# Patient Record
Sex: FEMALE | Race: WHITE | URBAN - METROPOLITAN AREA
[De-identification: names, ages, dates, MRNs, and addresses within clinical notes are randomized per-mention and may not be internally consistent; named-entity substitution may affect disease eponyms.]

---

## 2018-10-22 ENCOUNTER — IMMUNIZATION (OUTPATIENT)
Dept: SOCIAL WORK | Facility: CLINIC | Age: 53
End: 2018-10-22
Payer: COMMERCIAL

## 2018-10-22 DIAGNOSIS — Z23 NEED FOR VACCINATION: ICD-10-CM

## 2018-10-22 PROCEDURE — 90471 IMMUNIZATION ADMIN: CPT | Performed by: REGISTERED NURSE

## 2018-10-22 PROCEDURE — 90686 IIV4 VACC NO PRSV 0.5 ML IM: CPT | Performed by: REGISTERED NURSE

## 2020-10-04 ENCOUNTER — OFFICE VISIT (OUTPATIENT)
Dept: URGENT CARE | Facility: CLINIC | Age: 55
End: 2020-10-04
Payer: COMMERCIAL

## 2020-10-04 VITALS
OXYGEN SATURATION: 99 % | BODY MASS INDEX: 31.23 KG/M2 | TEMPERATURE: 96.9 F | WEIGHT: 199 LBS | HEIGHT: 67 IN | DIASTOLIC BLOOD PRESSURE: 78 MMHG | SYSTOLIC BLOOD PRESSURE: 108 MMHG | RESPIRATION RATE: 14 BRPM | HEART RATE: 113 BPM

## 2020-10-04 DIAGNOSIS — M54.40 ACUTE BILATERAL LOW BACK PAIN WITH SCIATICA, SCIATICA LATERALITY UNSPECIFIED: ICD-10-CM

## 2020-10-04 PROCEDURE — 99213 OFFICE O/P EST LOW 20 MIN: CPT | Performed by: PHYSICIAN ASSISTANT

## 2020-10-04 RX ORDER — NAPROXEN 500 MG/1
500 TABLET ORAL 2 TIMES DAILY WITH MEALS
Qty: 30 TAB | Refills: 0 | Status: SHIPPED | OUTPATIENT
Start: 2020-10-04

## 2020-10-04 RX ORDER — PREDNISONE 10 MG/1
40 TABLET ORAL DAILY
Qty: 20 TAB | Refills: 0 | Status: SHIPPED | OUTPATIENT
Start: 2020-10-04 | End: 2020-10-09

## 2020-10-04 ASSESSMENT — ENCOUNTER SYMPTOMS
CHILLS: 0
FEVER: 0
FALLS: 0
VOMITING: 0
ABDOMINAL PAIN: 0
SORE THROAT: 0
MYALGIAS: 0
CONSTIPATION: 0
DIARRHEA: 0
EYE PAIN: 0
SHORTNESS OF BREATH: 0
BACK PAIN: 1
NAUSEA: 0
COUGH: 0
HEADACHES: 0

## 2020-10-04 NOTE — LETTER
October 4, 2020    To Whom It May Concern:         This is confirmation that Christina Villareal attended her scheduled appointment with Ramirez Kraft P.A.-C. on 10/04/20.  I have advised her to remain off of work for the next three days to rest and recuperate.          If you have any questions please do not hesitate to call me at the phone number listed below.    Sincerely,          Ramirez Kraft P.A.-C.  [phone]

## 2020-10-04 NOTE — PATIENT INSTRUCTIONS
· Prednisone for 5 days  · Start naproxen twice daily  · Apply diclofenac cream once daily  · May use 500mg of tylenol twice daily as well  · Ice or heat for 10-15 minutes every 2-3 hours  · 3 days off of work  · Referral to primary care and physical therapy.      Chronic Back Pain  When back pain lasts longer than 3 months, it is called chronic back pain. Pain may get worse at certain times (flare-ups). There are things you can do at home to manage your pain.  Follow these instructions at home:  Activity         · Avoid bending and other activities that make pain worse.  · When standing:  ? Keep your upper back and neck straight.  ? Keep your shoulders pulled back.  ? Avoid slouching.  · When sitting:  ? Keep your back straight.  ? Relax your shoulders. Do not round your shoulders or pull them backward.  · Do not sit or  one place for long periods of time.  · Take short rest breaks during the day. Lying down or standing is usually better than sitting. Resting can help relieve pain.  · When sitting or lying down for a long time, do some mild activity or stretching. This will help to prevent stiffness and pain.  · Get regular exercise. Ask your doctor what activities are safe for you.  · Do not lift anything that is heavier than 10 lb (4.5 kg). To prevent injury when you lift things:  ? Bend your knees.  ? Keep the weight close to your body.  ? Avoid twisting.  Managing pain  · If told, put ice on the painful area. Your doctor may tell you to use ice for 24-48 hours after a flare-up starts.  ? Put ice in a plastic bag.  ? Place a towel between your skin and the bag.  ? Leave the ice on for 20 minutes, 2-3 times a day.  · If told, put heat on the painful area as often as told by your doctor. Use the heat source that your doctor recommends, such as a moist heat pack or a heating pad.  ? Place a towel between your skin and the heat source.  ? Leave the heat on for 20-30 minutes.  ? Remove the heat if your skin  turns bright red. This is especially important if you are unable to feel pain, heat, or cold. You may have a greater risk of getting burned.  · Soak in a warm bath. This can help relieve pain.  · Take over-the-counter and prescription medicines only as told by your doctor.  General instructions  · Sleep on a firm mattress. Try lying on your side with your knees slightly bent. If you lie on your back, put a pillow under your knees.  · Keep all follow-up visits as told by your doctor. This is important.  Contact a doctor if:  · You have pain that does not get better with rest or medicine.  Get help right away if:  · One or both of your arms or legs feel weak.  · One or both of your arms or legs lose feeling (numbness).  · You have trouble controlling when you poop (bowel movement) or pee (urinate).  · You feel sick to your stomach (nauseous).  · You throw up (vomit).  · You have belly (abdominal) pain.  · You have shortness of breath.  · You pass out (faint).  Summary  · When back pain lasts longer than 3 months, it is called chronic back pain.  · Pain may get worse at certain times (flare-ups).  · Use ice and heat as told by your doctor. Your doctor may tell you to use ice after flare-ups.  This information is not intended to replace advice given to you by your health care provider. Make sure you discuss any questions you have with your health care provider.  Document Released: 06/05/2009 Document Revised: 04/09/2020 Document Reviewed: 08/02/2018  [employer]vier Patient Education © 2020 [employer]

## 2020-10-04 NOTE — PROGRESS NOTES
"Subjective:   Christina Villareal is a 55 y.o. female who presents for Back Pain (Lower back pain x 3 weeks \"comes and goes\", feels like theres yanking when she tilts her head. 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  She has a remote injury of the back sustained around 10 years ago from a fall from a ladder and feels like this is flared up her pain.  She has bilateral low back pain and noticed occasional sharp shooting pain down the right leg with bending.  She is ambulatory but walks with a limp due to her pain.  She has not been taking any Tylenol or ibuprofen as she feels like these have not been beneficial to her in the past but she has been using topical warming patches as well as hot pads which seem to help.  She denies any numbness or tingling or weakness in the legs.  She has no bladder or bowel issues and no saddle anesthesia.  She has no acute injury or trauma but reports a slow progression of her back pain over the last 3 weeks      Review of Systems   Constitutional: Negative for chills and fever.   HENT: Negative for congestion, ear pain and sore throat.    Eyes: Negative for pain.   Respiratory: Negative for cough and shortness of breath.    Cardiovascular: Negative for chest pain.   Gastrointestinal: Negative for abdominal pain, constipation, diarrhea, nausea and vomiting.   Genitourinary: Negative for dysuria.   Musculoskeletal: Positive for back pain. Negative for falls and myalgias.   Skin: Negative for rash.   Neurological: Negative for headaches.       Medications:    • ALIVE WOMENS 50+ PO  • atorvastatin Tabs  • B COMPLEX 1 PO  • Diclofenac Sodium Gel  • doxylamine Tabs  • fluticasone  • naproxen Tabs  • omeprazole  • predniSONE Tabs    Allergies: Patient has no known allergies.    Problem List: Christina Vlilareal has Hyperlipidemia; " "Gastroesophageal reflux disease without esophagitis; Family history of breast cancer in first degree relative; Chronic back pain; Perimenopausal symptoms; and Dyslipidemia, goal LDL below 160 on their problem list.    Surgical History:  No past surgical history on file.    Past Social Hx: Christina Villareal  reports that she has quit smoking. Her smoking use included cigarettes. She has a 3.75 pack-year smoking history. She has never used smokeless tobacco. She reports current drug use. Drugs: Marijuana and Inhaled. She reports that she does not drink alcohol.     Past Family Hx:  Christina Villareal family history includes Arthritis in her mother; Cancer in her father and maternal grandmother; Cancer (age of onset: 53) in her mother; Diabetes in her paternal grandmother.     Problem list, medications, and allergies reviewed by myself today in Epic.     Objective:     /78 (BP Location: Left arm, Patient Position: Sitting, BP Cuff Size: Adult)   Pulse (!) 113   Temp 36.1 °C (96.9 °F) (Temporal)   Resp 14   Ht 1.689 m (5' 6.5\")   Wt 90.3 kg (199 lb)   SpO2 99%   BMI 31.64 kg/m²     Physical Exam  Vitals signs reviewed.   Constitutional:       Appearance: Normal appearance.      Comments: Uncomfortable appearing, stooped forward sitting on the exam table.  Winces when changing positions but as I escort her out of the building after the exam she is able to walk comfortably when she has taken a few steps   HENT:      Head: Normocephalic and atraumatic.      Right Ear: External ear normal.      Left Ear: External ear normal.      Nose: Nose normal.      Mouth/Throat:      Mouth: Mucous membranes are moist.   Eyes:      Conjunctiva/sclera: Conjunctivae normal.   Cardiovascular:      Rate and Rhythm: Normal rate.   Pulmonary:      Effort: Pulmonary effort is normal.   Abdominal:      Tenderness: There is no abdominal tenderness. There is no right CVA tenderness or left CVA tenderness.   Musculoskeletal:      Comments: " Bilateral lumbar paraspinal TTP and muscle spasm.  No midline step-off, crepitus or deformity.  No appreciated curvature of the spine.  Positive active straight leg raise on the right, negative passive straight leg raise bilaterally.  Ambulatory with an antalgic gait.  5 out of 5 strength of the extremities.  2+ patellar reflexes symmetrical bilaterally.  Flexion extension of the lumbar spine limited secondary to pain.  N/V intact distally in BLE   Skin:     General: Skin is warm and dry.      Capillary Refill: Capillary refill takes less than 2 seconds.   Neurological:      Mental Status: She is alert and oriented to person, place, and time.         Assessment/Plan:     Diagnosis and associated orders:     1. Acute bilateral low back pain with sciatica, sciatica laterality unspecified  REFERRAL TO FOLLOW-UP WITH PRIMARY CARE    REFERRAL TO SPORTS MEDICINE    predniSONE (DELTASONE) 10 MG Tab    Diclofenac Sodium 1 % Gel    naproxen (NAPROSYN) 500 MG Tab      Comments/MDM:     • Patient presents with acute on chronic low back pain with no red flags for cauda equina or conus medullaris syndrome.  Sounds like she is very active at her job which is contributing to her pain and she is not been taking any nonsteroidal anti-inflammatories.  I will set her up with referrals to sports medicine and primary care and put her on a burst of steroids to calm down her radiculopathy.  She seems very reticent to take any oral medications so I prescribed naproxen in hopes that this will benefit her over OTC Tylenol or ibuprofen.  She may also benefit from topical diclofenac gel.  I discussed red flag return precautions and provided her with a work note advising 3 days off of work so that she can rest and recuperate.           Differential diagnosis, natural history, supportive care, and indications for immediate follow-up discussed.    Advised the patient to follow-up with the primary care physician for recheck, reevaluation, and  consideration of further management.    Please note that this dictation was created using voice recognition software. I have made a reasonable attempt to correct obvious errors, but I expect that there are errors of grammar and possibly content that I did not discover before finalizing the note.    This note was electronically signed by Ramirez Kraft PA-C

## 2023-05-02 PROBLEM — S43.432A SLAP LESION OF LEFT SHOULDER: Status: ACTIVE | Noted: 2023-05-02

## 2023-05-02 PROBLEM — M75.22 TENDONITIS OF UPPER BICEPS TENDON OF LEFT SHOULDER: Status: ACTIVE | Noted: 2023-05-02

## 2023-05-02 PROBLEM — M75.32 CALCIFIC TENDONITIS OF LEFT SHOULDER: Status: ACTIVE | Noted: 2023-05-02
